# Patient Record
Sex: FEMALE | Race: OTHER | URBAN - METROPOLITAN AREA
[De-identification: names, ages, dates, MRNs, and addresses within clinical notes are randomized per-mention and may not be internally consistent; named-entity substitution may affect disease eponyms.]

---

## 2023-04-06 ENCOUNTER — TELEPHONE (OUTPATIENT)
Dept: OBGYN CLINIC | Age: 33
End: 2023-04-06

## 2023-04-06 NOTE — TELEPHONE ENCOUNTER
Pt LVM requesting to know date and time of next appointment. Attempted to call patient, no answer. Voicemail left with next appointment information.  4/19/2023 at [place] [id number]

## 2024-02-26 ENCOUNTER — OFFICE VISIT (OUTPATIENT)
Dept: OBGYN CLINIC | Age: 34
End: 2024-02-26
Payer: COMMERCIAL

## 2024-02-26 VITALS
WEIGHT: 212 LBS | HEIGHT: 66 IN | DIASTOLIC BLOOD PRESSURE: 62 MMHG | BODY MASS INDEX: 34.07 KG/M2 | SYSTOLIC BLOOD PRESSURE: 114 MMHG

## 2024-02-26 DIAGNOSIS — Z01.419 WELL WOMAN EXAM WITH ROUTINE GYNECOLOGICAL EXAM: Primary | ICD-10-CM

## 2024-02-26 DIAGNOSIS — N94.12 DEEP DYSPAREUNIA: ICD-10-CM

## 2024-02-26 DIAGNOSIS — N73.0 PID (ACUTE PELVIC INFLAMMATORY DISEASE): ICD-10-CM

## 2024-02-26 DIAGNOSIS — N92.1 MENOMETRORRHAGIA: ICD-10-CM

## 2024-02-26 DIAGNOSIS — Z11.3 SCREENING FOR STD (SEXUALLY TRANSMITTED DISEASE): ICD-10-CM

## 2024-02-26 DIAGNOSIS — N91.1 SECONDARY AMENORRHEA: ICD-10-CM

## 2024-02-26 DIAGNOSIS — Z11.51 SCREENING FOR HPV (HUMAN PAPILLOMAVIRUS): ICD-10-CM

## 2024-02-26 DIAGNOSIS — N94.9 CERVICAL MOTION TENDERNESS: ICD-10-CM

## 2024-02-26 DIAGNOSIS — N94.6 DYSMENORRHEA: ICD-10-CM

## 2024-02-26 DIAGNOSIS — Z12.4 SCREENING FOR CERVICAL CANCER: ICD-10-CM

## 2024-02-26 PROCEDURE — 99385 PREV VISIT NEW AGE 18-39: CPT | Performed by: OBSTETRICS & GYNECOLOGY

## 2024-02-26 PROCEDURE — 99459 PELVIC EXAMINATION: CPT | Performed by: OBSTETRICS & GYNECOLOGY

## 2024-02-26 PROCEDURE — 99214 OFFICE O/P EST MOD 30 MIN: CPT | Performed by: OBSTETRICS & GYNECOLOGY

## 2024-02-26 NOTE — PROGRESS NOTES
CC:  Annual GYN exam    HPI:  33 y.o.   presents today for a NEW pt routine gynecological examination.  Patient's last menstrual period was 2024. but prior to her most recent period, her last period was in 2023.  Reports home UPTs negative.   Wants to discuss her irregular cycles and UTI symptoms.  Prior GYN care from NP Gina Dressler ([facility]).      PCP:  Dr Garo Yuan   (fam med); seees annually and PrN     Contraception: abstinence currently   Last sexually active New Year's Shea --desires full STD testing  Reports home UPTs negative        Hx HMB/IMB (Oligomenorrhea), hx prolonged secondary amenorrhea, severe dysmenorrhea:    Menarche at 11yo   Reports that she has always had irregular periods since menarche  Has been on OCPs from age [age] for both period regulation and birth control -- came off the ocps and had no period for 1.5hyrs .  Mostly has a period every other month now on no hormonal control    When does have a period it will start light then very heavy w/ extreme cramps.  Typically vb last 4-6 days   Dyspareunia with deep penetration in the past off and on; no hx post coital VB,   All of this was well-regulated in the past  w/ OCPs        Denies any si/sx hirsutism/androgen excess  No nipple DC, daily headaches, or peripheral vision changes/loss  BMI 35    Unaware of any hx anemia     Reports her PCP discussed possible PCOS in the past but no prior official diagnosis  Denies any prior discussion about possible endometriosis; no hx diagnostic laparoscopy                  GYN HISTORY:  As per HPI     Last Pap: 2019 at [facility]  Hx of Abnl Paps:  no    Hx STDs:  Trich, Gonorrhea/chlamydia 2017    Gardasil vaccine: yes         OB History          0    Para   0    Term   0       0    AB   0    Living   0         SAB   0    IAB   0    Ectopic   0    Molar   0    Multiple   0    Live Births   0                  Past Medical History:   Diagnosis Date    BMI

## 2024-03-04 PROBLEM — N94.6 DYSMENORRHEA: Status: ACTIVE | Noted: 2024-03-04

## 2024-03-04 PROBLEM — N94.12 DEEP DYSPAREUNIA: Status: ACTIVE | Noted: 2024-03-04

## 2024-03-04 PROBLEM — N92.1 MENOMETRORRHAGIA: Status: ACTIVE | Noted: 2024-03-04

## 2024-03-04 PROBLEM — N91.1 SECONDARY AMENORRHEA: Status: ACTIVE | Noted: 2024-03-04

## 2024-03-04 LAB
C TRACH RRNA CVX QL NAA+PROBE: NEGATIVE
COLLECTION METHOD: ABNORMAL
CYTOLOGIST CVX/VAG CYTO: ABNORMAL
CYTOLOGY CVX/VAG DOC THIN PREP: ABNORMAL
DATE OF LMP: ABNORMAL
HPV APTIMA: NEGATIVE
HPV GENOTYPE REFLEX: ABNORMAL
Lab: ABNORMAL
N GONORRHOEA RRNA CVX QL NAA+PROBE: NEGATIVE
OTHER PT INFO: ABNORMAL
PAP SOURCE: ABNORMAL
PATH REPORT.FINAL DX SPEC: ABNORMAL
PREV CYTO INFO: ABNORMAL
PREV TREATMENT RESULTS: ABNORMAL
PREV TREATMENT: ABNORMAL
STAT OF ADQ CVX/VAG CYTO-IMP: ABNORMAL
T VAGINALIS RRNA SPEC QL NAA+PROBE: POSITIVE

## 2024-03-04 RX ORDER — METRONIDAZOLE 500 MG/1
500 TABLET ORAL 2 TIMES DAILY
Qty: 14 TABLET | Refills: 0 | Status: SHIPPED | OUTPATIENT
Start: 2024-03-04 | End: 2024-03-11

## 2024-03-04 RX ORDER — DOXYCYCLINE HYCLATE 100 MG
100 TABLET ORAL 2 TIMES DAILY
Qty: 14 TABLET | Refills: 0 | Status: SHIPPED | OUTPATIENT
Start: 2024-03-04 | End: 2024-03-11

## 2024-03-07 PROBLEM — N73.0 PID (ACUTE PELVIC INFLAMMATORY DISEASE): Status: ACTIVE | Noted: 2024-03-07

## 2024-03-07 NOTE — RESULT ENCOUNTER NOTE
Negative cotesting but + Trich     She was actually treated for PID at last encounter which covers the Trichomonas (assuming she completed her antibiotics already given)    [facility]; no sex until BOTH have been adequately treated for 1wk.  Get RUPERT 4wks

## 2024-03-19 ENCOUNTER — TELEPHONE (OUTPATIENT)
Dept: OBGYN CLINIC | Age: 34
End: 2024-03-19

## 2024-04-18 ENCOUNTER — PROCEDURE VISIT (OUTPATIENT)
Dept: OBGYN CLINIC | Age: 34
End: 2024-04-18
Payer: COMMERCIAL

## 2024-04-18 ENCOUNTER — OFFICE VISIT (OUTPATIENT)
Dept: OBGYN CLINIC | Age: 34
End: 2024-04-18
Payer: COMMERCIAL

## 2024-04-18 VITALS — WEIGHT: 213 LBS | DIASTOLIC BLOOD PRESSURE: 78 MMHG | BODY MASS INDEX: 34.91 KG/M2 | SYSTOLIC BLOOD PRESSURE: 122 MMHG

## 2024-04-18 DIAGNOSIS — Z20.2 TRICHOMONAS CONTACT, TREATED: ICD-10-CM

## 2024-04-18 DIAGNOSIS — N91.5 OLIGOMENORRHEA, UNSPECIFIED TYPE: ICD-10-CM

## 2024-04-18 DIAGNOSIS — N94.12 DEEP DYSPAREUNIA: ICD-10-CM

## 2024-04-18 DIAGNOSIS — N73.0 PID (ACUTE PELVIC INFLAMMATORY DISEASE): ICD-10-CM

## 2024-04-18 DIAGNOSIS — A59.9 TRICHOMONIASIS: ICD-10-CM

## 2024-04-18 DIAGNOSIS — N94.6 DYSMENORRHEA: ICD-10-CM

## 2024-04-18 DIAGNOSIS — N91.1 SECONDARY AMENORRHEA: Primary | ICD-10-CM

## 2024-04-18 DIAGNOSIS — N94.9 CERVICAL MOTION TENDERNESS: ICD-10-CM

## 2024-04-18 DIAGNOSIS — N92.1 MENOMETRORRHAGIA: Primary | ICD-10-CM

## 2024-04-18 DIAGNOSIS — Z11.3 SCREENING FOR STD (SEXUALLY TRANSMITTED DISEASE): ICD-10-CM

## 2024-04-18 DIAGNOSIS — Z30.09 ENCOUNTER FOR COUNSELING REGARDING CONTRACEPTION: ICD-10-CM

## 2024-04-18 LAB
HBV SURFACE AG SER QL: NONREACTIVE
HCV AB SER QL: NONREACTIVE
HIV 1+2 AB+HIV1 P24 AG SERPL QL IA: NONREACTIVE
HIV 1/2 RESULT COMMENT: NORMAL
PROLACTIN SERPL-MCNC: 7.2 NG/ML
T4 FREE SERPL-MCNC: 1.3 NG/DL (ref 0.78–1.46)
TSH, 3RD GENERATION: 1.69 UIU/ML (ref 0.36–3.74)

## 2024-04-18 PROCEDURE — 99417 PROLNG OP E/M EACH 15 MIN: CPT | Performed by: OBSTETRICS & GYNECOLOGY

## 2024-04-18 PROCEDURE — 76830 TRANSVAGINAL US NON-OB: CPT | Performed by: OBSTETRICS & GYNECOLOGY

## 2024-04-18 PROCEDURE — 99459 PELVIC EXAMINATION: CPT | Performed by: OBSTETRICS & GYNECOLOGY

## 2024-04-18 PROCEDURE — 99215 OFFICE O/P EST HI 40 MIN: CPT | Performed by: OBSTETRICS & GYNECOLOGY

## 2024-04-18 RX ORDER — PROGESTERONE 200 MG/1
CAPSULE ORAL
Qty: 10 CAPSULE | Refills: 5 | Status: CANCELLED | OUTPATIENT
Start: 2024-04-18

## 2024-04-19 LAB
EST. AVERAGE GLUCOSE BLD GHB EST-MCNC: 103 MG/DL
HBA1C MFR BLD: 5.2 % (ref 4.8–5.6)
RPR SER QL: NONREACTIVE

## 2024-04-20 LAB — DHEA-S SERPL-MCNC: 157 UG/DL (ref 84.8–378)

## 2024-04-21 LAB
SPECIMEN SOURCE: NORMAL
T VAGINALIS RRNA SPEC QL NAA+PROBE: NEGATIVE

## 2024-04-22 LAB — MIS SERPL-MCNC: 3.31 NG/ML

## 2024-04-23 LAB
TESTOST FREE SERPL-MCNC: 1.8 PG/ML (ref 0–4.2)
TESTOST SERPL-MCNC: 39 NG/DL (ref 8–60)

## 2024-04-24 LAB — 17OHP SERPL-MCNC: 28 NG/DL

## 2024-04-29 RX ORDER — PROGESTERONE 200 MG/1
CAPSULE ORAL
Qty: 30 CAPSULE | Refills: 4 | Status: SHIPPED | OUTPATIENT
Start: 2024-04-29

## 2024-04-29 NOTE — PROGRESS NOTES
CC:  TVUS and FU for diagnosis of PID last encounter, +Trich, HMB/IMB, severe dysmenorrhea, hx prolonged secondary amenorrhea, hx deep dyspareunia:       HPI:  33 y.o.  G0 presents today for TVUS and FU for diagnosis of PID last encounter, +Trich, HMB/IMB, severe dysmenorrhea, hx prolonged secondary amenorrhea, hx deep dyspareunia.    See last note from NEW patient annual exam on 2/26/24 for complete details.       PCP:  Dr Garo Yuan   (fam med); seees annually and PrN     Contraception: abstinence currently   Last sexually active New Year's Shea --  full STD testing +Trich  (2/26/24)            Hx HMB/IMB (Oligomenorrhea), hx prolonged secondary amenorrhea, severe dysmenorrhea:    Menarche at 11yo   Reports that she has always had irregular periods since menarche  Has been on OCPs from age [age] for both period regulation and birth control -- came off the ocps and had no period for 1.5hyrs .  Mostly has a period every other month now on no hormonal control  At last encounter (2/26/2024) she reported her LMP was 1/22/2024 but prior to that period her LMP was in July 2023.        When does have a period it will start light then very heavy w/ extreme cramps.  Typically vb last 4-6 days   Dyspareunia with deep penetration in the past \"off and on\"; no hx post coital VB,   All of this was well-regulated in the past  w/ OCPs        Denies any si/sx hirsutism/androgen excess  No nipple DC, daily headaches, or peripheral vision changes/loss  BMI 35    Unaware of any hx anemia     Reports her PCP discussed possible PCOS in the past but no prior official diagnosis  Denies any prior discussion about possible endometriosis; no hx diagnostic laparoscopy                Since last visit    Treated for PID at last encounter w/ +CMT and vaginal DC w/ frothy white appearance.  S/p Rocephin 500mg IM x 1 in office PLUS Rx -Doxycycline 100mg PO BID x 14 days PLUS Flagyl 500mg PO BID x 14 days.  Pt reports compliance w/ the abx    +

## 2024-05-06 RX ORDER — PROGESTERONE 200 MG/1
CAPSULE ORAL
Qty: 10 CAPSULE | Refills: 6 | Status: SHIPPED | OUTPATIENT
Start: 2024-05-06

## 2024-05-06 RX ORDER — PROGESTERONE 200 MG/1
CAPSULE ORAL
Qty: 30 CAPSULE | Refills: 4 | Status: CANCELLED | OUTPATIENT
Start: 2024-05-06

## 2024-07-30 ENCOUNTER — OFFICE VISIT (OUTPATIENT)
Dept: OBGYN CLINIC | Age: 34
End: 2024-07-30
Payer: COMMERCIAL

## 2024-07-30 VITALS
HEIGHT: 66 IN | WEIGHT: 210 LBS | DIASTOLIC BLOOD PRESSURE: 66 MMHG | BODY MASS INDEX: 33.75 KG/M2 | SYSTOLIC BLOOD PRESSURE: 112 MMHG

## 2024-07-30 DIAGNOSIS — N94.6 DYSMENORRHEA: ICD-10-CM

## 2024-07-30 DIAGNOSIS — N92.1 MENOMETRORRHAGIA: ICD-10-CM

## 2024-07-30 DIAGNOSIS — N91.5 OLIGOMENORRHEA, UNSPECIFIED TYPE: ICD-10-CM

## 2024-07-30 DIAGNOSIS — N91.1 SECONDARY AMENORRHEA: Primary | ICD-10-CM

## 2024-07-30 PROCEDURE — 99215 OFFICE O/P EST HI 40 MIN: CPT | Performed by: OBSTETRICS & GYNECOLOGY

## 2024-07-30 PROCEDURE — 99417 PROLNG OP E/M EACH 15 MIN: CPT | Performed by: OBSTETRICS & GYNECOLOGY

## 2024-07-30 SDOH — ECONOMIC STABILITY: FOOD INSECURITY: WITHIN THE PAST 12 MONTHS, YOU WORRIED THAT YOUR FOOD WOULD RUN OUT BEFORE YOU GOT MONEY TO BUY MORE.: NEVER TRUE

## 2024-07-30 SDOH — ECONOMIC STABILITY: HOUSING INSECURITY
IN THE LAST 12 MONTHS, WAS THERE A TIME WHEN YOU DID NOT HAVE A STEADY PLACE TO SLEEP OR SLEPT IN A SHELTER (INCLUDING NOW)?: NO

## 2024-07-30 SDOH — ECONOMIC STABILITY: FOOD INSECURITY: WITHIN THE PAST 12 MONTHS, THE FOOD YOU BOUGHT JUST DIDN'T LAST AND YOU DIDN'T HAVE MONEY TO GET MORE.: NEVER TRUE

## 2024-07-30 SDOH — ECONOMIC STABILITY: INCOME INSECURITY: HOW HARD IS IT FOR YOU TO PAY FOR THE VERY BASICS LIKE FOOD, HOUSING, MEDICAL CARE, AND HEATING?: NOT HARD AT ALL

## 2024-07-30 ASSESSMENT — PATIENT HEALTH QUESTIONNAIRE - PHQ9
SUM OF ALL RESPONSES TO PHQ QUESTIONS 1-9: 0
SUM OF ALL RESPONSES TO PHQ9 QUESTIONS 1 & 2: 0
2. FEELING DOWN, DEPRESSED OR HOPELESS: NOT AT ALL
SUM OF ALL RESPONSES TO PHQ QUESTIONS 1-9: 0
1. LITTLE INTEREST OR PLEASURE IN DOING THINGS: NOT AT ALL

## 2024-07-30 NOTE — PROGRESS NOTES
34.0-34.9,adult     Deep dyspareunia     Dysmenorrhea     Menometrorrhagia     PID (acute pelvic inflammatory disease)     Secondary amenorrhea          Past Surgical History:   Procedure Laterality Date    ELBOW SURGERY Right          Outpatient Encounter Medications as of 7/30/2024   Medication Sig Dispense Refill    progesterone (PROMETRIUM) 200 MG CAPS capsule Take 1 capsule by mouth every night x 10 days every month only if no period on her own 10 capsule 6    phentermine (ADIPEX-P) 37.5 MG tablet Take 1 tablet by mouth daily. Max Daily Amount: 37.5 mg      sulfamethoxazole-trimethoprim (BACTRIM DS;SEPTRA DS) 800-160 MG per tablet Take 1 tablet by mouth in the morning and 1 tablet in the evening.       No facility-administered encounter medications on file as of 7/30/2024.         No Known Allergies      Family History   Problem Relation Age of Onset    Diabetes Mother          Social History     Socioeconomic History    Marital status: Single   Tobacco Use    Smoking status: Never    Smokeless tobacco: Never   Vaping Use    Vaping Use: Never used   Substance and Sexual Activity    Alcohol use: Yes    Drug use: Never    Sexual activity: Not Currently     Social Determinants of Health     Financial Resource Strain: Low Risk  (7/30/2024)    Overall Financial Resource Strain (CARDIA)     Difficulty of Paying Living Expenses: Not hard at all   Food Insecurity: No Food Insecurity (7/30/2024)    Hunger Vital Sign     Worried About Running Out of Food in the Last Year: Never true     Ran Out of Food in the Last Year: Never true   Transportation Needs: Unknown (7/30/2024)    PRAPARE - Transportation     Lack of Transportation (Non-Medical): No   Housing Stability: Unknown (7/30/2024)    Housing Stability Vital Sign     Unstable Housing in the Last Year: No           ROS:  Constitutional: Negative for chills, fever and weight loss.   HENT: Negative for hearing loss.   Eyes: Negative for blurred vision and double vision.

## 2024-08-05 RX ORDER — PHENTERMINE HYDROCHLORIDE 37.5 MG/1
1 TABLET ORAL DAILY
COMMUNITY

## 2024-08-05 RX ORDER — SULFAMETHOXAZOLE AND TRIMETHOPRIM 800; 160 MG/1; MG/1
1 TABLET ORAL EVERY 12 HOURS
COMMUNITY